# Patient Record
Sex: MALE | Race: WHITE | NOT HISPANIC OR LATINO | Employment: FULL TIME | URBAN - METROPOLITAN AREA
[De-identification: names, ages, dates, MRNs, and addresses within clinical notes are randomized per-mention and may not be internally consistent; named-entity substitution may affect disease eponyms.]

---

## 2020-10-02 ENCOUNTER — OFFICE VISIT - RIVER FALLS (OUTPATIENT)
Dept: FAMILY MEDICINE | Facility: CLINIC | Age: 50
End: 2020-10-02

## 2020-10-02 ENCOUNTER — COMMUNICATION - RIVER FALLS (OUTPATIENT)
Dept: FAMILY MEDICINE | Facility: CLINIC | Age: 50
End: 2020-10-02

## 2020-10-02 ENCOUNTER — AMBULATORY - RIVER FALLS (OUTPATIENT)
Dept: FAMILY MEDICINE | Facility: CLINIC | Age: 50
End: 2020-10-02

## 2021-11-17 DIAGNOSIS — Z11.59 ENCOUNTER FOR SCREENING FOR OTHER VIRAL DISEASES: ICD-10-CM

## 2021-12-09 RX ORDER — FLUOXETINE 40 MG/1
40 CAPSULE ORAL DAILY
COMMUNITY

## 2021-12-10 ENCOUNTER — LAB (OUTPATIENT)
Dept: LAB | Facility: CLINIC | Age: 51
End: 2021-12-10
Attending: INTERNAL MEDICINE
Payer: COMMERCIAL

## 2021-12-10 DIAGNOSIS — Z11.59 ENCOUNTER FOR SCREENING FOR OTHER VIRAL DISEASES: ICD-10-CM

## 2021-12-10 PROCEDURE — U0005 INFEC AGEN DETEC AMPLI PROBE: HCPCS

## 2021-12-10 PROCEDURE — U0003 INFECTIOUS AGENT DETECTION BY NUCLEIC ACID (DNA OR RNA); SEVERE ACUTE RESPIRATORY SYNDROME CORONAVIRUS 2 (SARS-COV-2) (CORONAVIRUS DISEASE [COVID-19]), AMPLIFIED PROBE TECHNIQUE, MAKING USE OF HIGH THROUGHPUT TECHNOLOGIES AS DESCRIBED BY CMS-2020-01-R: HCPCS

## 2021-12-11 LAB — SARS-COV-2 RNA RESP QL NAA+PROBE: NEGATIVE

## 2021-12-12 ENCOUNTER — HEALTH MAINTENANCE LETTER (OUTPATIENT)
Age: 51
End: 2021-12-12

## 2021-12-13 ENCOUNTER — ANESTHESIA (OUTPATIENT)
Dept: SURGERY | Facility: CLINIC | Age: 51
End: 2021-12-13
Payer: COMMERCIAL

## 2021-12-13 ENCOUNTER — ANESTHESIA EVENT (OUTPATIENT)
Dept: SURGERY | Facility: CLINIC | Age: 51
End: 2021-12-13
Payer: COMMERCIAL

## 2021-12-13 ENCOUNTER — HOSPITAL ENCOUNTER (OUTPATIENT)
Facility: CLINIC | Age: 51
Discharge: HOME | End: 2021-12-13
Attending: INTERNAL MEDICINE | Admitting: INTERNAL MEDICINE
Payer: COMMERCIAL

## 2021-12-13 VITALS
SYSTOLIC BLOOD PRESSURE: 137 MMHG | BODY MASS INDEX: 47.74 KG/M2 | OXYGEN SATURATION: 100 % | HEART RATE: 54 BPM | DIASTOLIC BLOOD PRESSURE: 91 MMHG | HEIGHT: 68 IN | WEIGHT: 315 LBS | RESPIRATION RATE: 18 BRPM | TEMPERATURE: 97 F

## 2021-12-13 LAB — COLONOSCOPY: NORMAL

## 2021-12-13 PROCEDURE — 272N000001 HC OR GENERAL SUPPLY STERILE: Performed by: INTERNAL MEDICINE

## 2021-12-13 PROCEDURE — 999N000141 HC STATISTIC PRE-PROCEDURE NURSING ASSESSMENT: Performed by: INTERNAL MEDICINE

## 2021-12-13 PROCEDURE — 710N000012 HC RECOVERY PHASE 2, PER MINUTE: Performed by: INTERNAL MEDICINE

## 2021-12-13 PROCEDURE — 360N000075 HC SURGERY LEVEL 2, PER MIN: Performed by: INTERNAL MEDICINE

## 2021-12-13 PROCEDURE — 370N000017 HC ANESTHESIA TECHNICAL FEE, PER MIN: Performed by: INTERNAL MEDICINE

## 2021-12-13 PROCEDURE — 88305 TISSUE EXAM BY PATHOLOGIST: CPT | Mod: TC | Performed by: INTERNAL MEDICINE

## 2021-12-13 PROCEDURE — 258N000003 HC RX IP 258 OP 636: Performed by: ANESTHESIOLOGY

## 2021-12-13 PROCEDURE — 250N000011 HC RX IP 250 OP 636: Performed by: NURSE ANESTHETIST, CERTIFIED REGISTERED

## 2021-12-13 RX ORDER — MEPERIDINE HYDROCHLORIDE 50 MG/ML
12.5 INJECTION INTRAMUSCULAR; INTRAVENOUS; SUBCUTANEOUS
Status: CANCELLED | OUTPATIENT
Start: 2021-12-13

## 2021-12-13 RX ORDER — ONDANSETRON 2 MG/ML
4 INJECTION INTRAMUSCULAR; INTRAVENOUS
Status: DISCONTINUED | OUTPATIENT
Start: 2021-12-13 | End: 2021-12-13 | Stop reason: HOSPADM

## 2021-12-13 RX ORDER — LIDOCAINE 40 MG/G
CREAM TOPICAL
Status: DISCONTINUED | OUTPATIENT
Start: 2021-12-13 | End: 2021-12-13 | Stop reason: HOSPADM

## 2021-12-13 RX ORDER — ONDANSETRON 2 MG/ML
4 INJECTION INTRAMUSCULAR; INTRAVENOUS EVERY 30 MIN PRN
Status: CANCELLED | OUTPATIENT
Start: 2021-12-13

## 2021-12-13 RX ORDER — FLUMAZENIL 0.1 MG/ML
0.2 INJECTION, SOLUTION INTRAVENOUS
Status: CANCELLED | OUTPATIENT
Start: 2021-12-13 | End: 2021-12-13

## 2021-12-13 RX ORDER — ONDANSETRON 4 MG/1
4 TABLET, ORALLY DISINTEGRATING ORAL EVERY 30 MIN PRN
Status: CANCELLED | OUTPATIENT
Start: 2021-12-13

## 2021-12-13 RX ORDER — LIDOCAINE 40 MG/G
CREAM TOPICAL
Status: CANCELLED | OUTPATIENT
Start: 2021-12-13

## 2021-12-13 RX ORDER — CALCIUM CARBONATE 500 MG/1
2 TABLET, CHEWABLE ORAL
COMMUNITY

## 2021-12-13 RX ORDER — NALOXONE HYDROCHLORIDE 0.4 MG/ML
0.2 INJECTION, SOLUTION INTRAMUSCULAR; INTRAVENOUS; SUBCUTANEOUS
Status: CANCELLED | OUTPATIENT
Start: 2021-12-13

## 2021-12-13 RX ORDER — NALOXONE HYDROCHLORIDE 0.4 MG/ML
0.4 INJECTION, SOLUTION INTRAMUSCULAR; INTRAVENOUS; SUBCUTANEOUS
Status: CANCELLED | OUTPATIENT
Start: 2021-12-13

## 2021-12-13 RX ORDER — SODIUM CHLORIDE, SODIUM LACTATE, POTASSIUM CHLORIDE, CALCIUM CHLORIDE 600; 310; 30; 20 MG/100ML; MG/100ML; MG/100ML; MG/100ML
INJECTION, SOLUTION INTRAVENOUS CONTINUOUS
Status: CANCELLED | OUTPATIENT
Start: 2021-12-13

## 2021-12-13 RX ORDER — ONDANSETRON 4 MG/1
4 TABLET, ORALLY DISINTEGRATING ORAL EVERY 6 HOURS PRN
Status: CANCELLED | OUTPATIENT
Start: 2021-12-13

## 2021-12-13 RX ORDER — ONDANSETRON 2 MG/ML
4 INJECTION INTRAMUSCULAR; INTRAVENOUS EVERY 6 HOURS PRN
Status: CANCELLED | OUTPATIENT
Start: 2021-12-13

## 2021-12-13 RX ORDER — FENTANYL CITRATE 50 UG/ML
25 INJECTION, SOLUTION INTRAMUSCULAR; INTRAVENOUS
Status: CANCELLED | OUTPATIENT
Start: 2021-12-13

## 2021-12-13 RX ORDER — SODIUM CHLORIDE, SODIUM LACTATE, POTASSIUM CHLORIDE, CALCIUM CHLORIDE 600; 310; 30; 20 MG/100ML; MG/100ML; MG/100ML; MG/100ML
INJECTION, SOLUTION INTRAVENOUS CONTINUOUS
Status: DISCONTINUED | OUTPATIENT
Start: 2021-12-13 | End: 2021-12-13 | Stop reason: HOSPADM

## 2021-12-13 RX ORDER — FENTANYL CITRATE 50 UG/ML
25 INJECTION, SOLUTION INTRAMUSCULAR; INTRAVENOUS EVERY 5 MIN PRN
Status: CANCELLED | OUTPATIENT
Start: 2021-12-13

## 2021-12-13 RX ORDER — ALBUTEROL SULFATE 90 UG/1
2 AEROSOL, METERED RESPIRATORY (INHALATION) EVERY 6 HOURS
COMMUNITY

## 2021-12-13 RX ORDER — PROCHLORPERAZINE MALEATE 10 MG
10 TABLET ORAL EVERY 6 HOURS PRN
Status: CANCELLED | OUTPATIENT
Start: 2021-12-13

## 2021-12-13 RX ORDER — ONDANSETRON 2 MG/ML
4 INJECTION INTRAMUSCULAR; INTRAVENOUS
Status: CANCELLED | OUTPATIENT
Start: 2021-12-13

## 2021-12-13 RX ORDER — PROPOFOL 10 MG/ML
INJECTION, EMULSION INTRAVENOUS CONTINUOUS PRN
Status: DISCONTINUED | OUTPATIENT
Start: 2021-12-13 | End: 2021-12-13

## 2021-12-13 RX ADMIN — SODIUM CHLORIDE, POTASSIUM CHLORIDE, SODIUM LACTATE AND CALCIUM CHLORIDE: 600; 310; 30; 20 INJECTION, SOLUTION INTRAVENOUS at 09:02

## 2021-12-13 RX ADMIN — PROPOFOL 100 MCG/KG/MIN: 10 INJECTION, EMULSION INTRAVENOUS at 09:51

## 2021-12-13 ASSESSMENT — MIFFLIN-ST. JEOR: SCORE: 2445.44

## 2021-12-13 NOTE — ANESTHESIA CARE TRANSFER NOTE
Patient: Mainor Johnson    Procedure: Procedure(s):  COLONOSCOPY with polypectomy        Diagnosis: Screen for colon cancer [Z12.11]  Diagnosis Additional Information: No value filed.    Anesthesia Type:   MAC     Note:    Oropharynx: oropharynx clear of all foreign objects  Level of Consciousness: awake  Oxygen Supplementation: face mask  Level of Supplemental Oxygen (L/min / FiO2): 4  Independent Airway: airway patency satisfactory and stable  Dentition: dentition unchanged  Vital Signs Stable: post-procedure vital signs reviewed and stable  Report to RN Given: handoff report given  Patient transferred to: Phase II    Handoff Report: Identifed the Patient, Identified the Reponsible Provider, Reviewed the pertinent medical history, Discussed the surgical course, Reviewed Intra-OP anesthesia mangement and issues during anesthesia, Set expectations for post-procedure period and Allowed opportunity for questions and acknowledgement of understanding      Vitals:  Vitals Value Taken Time   BP     Temp     Pulse     Resp     SpO2         Electronically Signed By: KY Peters CRNA  VS pending from PACU RN, Pt conversant and stable when I left his side  December 13, 2021  10:22 AM

## 2021-12-13 NOTE — PRE-PROCEDURE INSTRUCTIONS
"PRE PROCEDURE NOTE      Chief Complaint/Reason for Procedure:              H and P reviewed    Screening colon      History and Physical Reviewed:   Reviewed no changes               Pre-sedation assessment:            /81   Temp 97  F (36.1  C) (Temporal)   Resp 16   Ht 1.715 m (5' 7.5\")   Wt (!) 162.4 kg (358 lb)   SpO2 97%   BMI 55.24 kg/m    General appearance: alert, appears stated age and cooperative  Lungs: clear to auscultation bilaterally  Heart: S1, S2 normal, no murmur, click, rub or gallop, regular rate and rhythm, chest is clear without rales or wheezing, no pedal edema, no JVD, no hepatosplenomegaly  Abdomen: soft, non-tender; bowel sounds normal; no masses,  no organomegaly  Extremities: extremities normal, atraumatic, no cyanosis or edema      Previous reaction to anesthesia/sedation:  [unfilled]      Sedation Plan based on assessment: Moderate      Comments: BMI      ASA Classification: as per anesthesiology      Impression:  Patient deemed adequate candidate for conscious sedation         Plan:   colonoscopy      Susan Avitia MD, MD  12/13/2021 9:42 AM  [facility]                              "

## 2021-12-13 NOTE — ANESTHESIA POSTPROCEDURE EVALUATION
Patient: Mainor Johnson    Procedure: Procedure(s):  COLONOSCOPY with polypectomy        Diagnosis:Screen for colon cancer [Z12.11]  Diagnosis Additional Information: No value filed.    Anesthesia Type:  MAC    Note:  Disposition: Outpatient   Postop Pain Control: Uneventful            Sign Out: Well controlled pain   PONV: No   Neuro/Psych: Uneventful            Sign Out: Acceptable/Baseline neuro status   Airway/Respiratory: Uneventful            Sign Out: Acceptable/Baseline resp. status   CV/Hemodynamics: Uneventful            Sign Out: Acceptable CV status; No obvious hypovolemia; No obvious fluid overload   Other NRE: NONE   DID A NON-ROUTINE EVENT OCCUR? No           Last vitals:  Vitals Value Taken Time   /85 12/13/21 1045   Temp 36.1  C (97  F) 12/13/21 1020   Pulse 50 12/13/21 1045   Resp 16 12/13/21 1045   SpO2 100 % 12/13/21 1045       Electronically Signed By: SURENDRA PACHECO MD  December 13, 2021  11:45 AM

## 2021-12-13 NOTE — ANESTHESIA PREPROCEDURE EVALUATION
Anesthesia Pre-Procedure Evaluation    Patient: Mainor Johnson   MRN: [id number] : 1970        Preoperative Diagnosis: Screen for colon cancer [Z12.11]    Procedure : Procedure(s):  COLONOSCOPY          Past Medical History:   Diagnosis Date     Arthritis      Fibromyalgia      Obese      Other chronic pain      Sleep apnea      Uncomplicated asthma       Past Surgical History:   Procedure Laterality Date     BACK SURGERY       ORTHOPEDIC SURGERY        No Known Allergies   Social History     Tobacco Use     Smoking status: Never Smoker     Smokeless tobacco: Never Used   Substance Use Topics     Alcohol use: Not Currently      Wt Readings from Last 1 Encounters:   21 (!) 162.4 kg (358 lb)        Anesthesia Evaluation   Pt has had prior anesthetic.     No history of anesthetic complications       ROS/MED HX  ENT/Pulmonary:     (+) sleep apnea, uses CPAP, Intermittent, asthma Treatment: Inhaler prn,      Neurologic:  - neg neurologic ROS     Cardiovascular:  - neg cardiovascular ROS     METS/Exercise Tolerance:     Hematologic:       Musculoskeletal:   (+) arthritis,     GI/Hepatic:     (+) GERD,     Renal/Genitourinary:  - neg Renal ROS     Endo:     (+) Obesity,     Psychiatric/Substance Use:     (+) psychiatric history anxiety     Infectious Disease:  - neg infectious disease ROS     Malignancy:  - neg malignancy ROS     Other:  - neg other ROS    (+) , H/O Chronic Pain,        Physical Exam    Airway  airway exam normal      Mallampati: IV   TM distance: > 3 FB   Neck ROM: full   Mouth opening: > 3 cm    Respiratory Devices and Support         Dental  no notable dental history         Cardiovascular   cardiovascular exam normal       Rhythm and rate: regular and normal     Pulmonary   pulmonary exam normal        breath sounds clear to auscultation           OUTSIDE LABS:  CBC: No results found for: WBC, HGB, HCT, PLT  BMP: No results found for: NA, POTASSIUM, CHLORIDE, CO2, BUN, CR, GLC  COAGS: No  results found for: PTT, INR, FIBR  POC: No results found for: BGM, HCG, HCGS  HEPATIC: No results found for: ALBUMIN, PROTTOTAL, ALT, AST, GGT, ALKPHOS, BILITOTAL, BILIDIRECT, JOSEFINA  OTHER: No results found for: PH, LACT, A1C, CANDACE, PHOS, MAG, LIPASE, AMYLASE, TSH, T4, T3, CRP, SED    Anesthesia Plan    ASA Status:  4   NPO Status:  NPO Appropriate    Anesthesia Type: MAC.     - Reason for MAC: straight local not clinically adequate              Consents    Anesthesia Plan(s) and associated risks, benefits, and realistic alternatives discussed. Questions answered and patient/representative(s) expressed understanding.     - Discussed: Risks, Benefits and Alternatives for BOTH SEDATION and the PROCEDURE were discussed     - Discussed with:  Patient         Postoperative Care    Pain management: IV analgesics, Oral pain medications, Multi-modal analgesia.   PONV prophylaxis: Ondansetron (or other 5HT-3), Dexamethasone or Solumedrol, Droperidol or Haldol, Background Propofol Infusion     Comments:    Other Comments: Spoke with patient may need to convert to a general anesthetic if MAC is not tolerated             SURENDRA PACHECO MD

## 2021-12-13 NOTE — OR NURSING
Dr. Sweeney MDA  Updated on low HR, as low at 44.  Pt states he feels anxious but not light headed or dizzy.  Pt up to side of bed per MDA orders to watch.  Able to drink and eat with slight increase in BP but then returns to low 50's high 40's.  Brandon belt placed on patient and ambulated to bathroom, able to urinate and per MDA ok to discharge at this time

## 2021-12-13 NOTE — CONSULTS
"PRE PROCEDURE NOTE      Chief Complaint/Reason for Procedure:              Colon screen  H and P reviewed      History and Physical Reviewed:   Reviewed no changes               Pre-sedation assessment:            /81   Temp 97  F (36.1  C) (Temporal)   Resp 16   Ht 1.715 m (5' 7.5\")   Wt (!) 162.4 kg (358 lb)   SpO2 97%   BMI 55.24 kg/m    General appearance: alert, appears stated age and cooperative  Lungs: clear to auscultation bilaterally  Heart: S1, S2 normal, no murmur, click, rub or gallop, regular rate and rhythm, chest is clear without rales or wheezing, no pedal edema, no JVD, no hepatosplenomegaly  Abdomen: soft, non-tender; bowel sounds normal; no masses,  no organomegaly  Extremities: extremities normal, atraumatic, no cyanosis or edema      Previous reaction to anesthesia/sedation:  [unfilled]      Sedation Plan based on assessment: Moderate      Comments:       ASA Classification: PIV      Impression:  Patient deemed adequate candidate for conscious sedation         Plan:   colonoscopy      Susan Vianca Avitia MD, MD  12/13/2021 9:48 AM  [facility]                              "

## 2021-12-14 LAB
PATH REPORT.COMMENTS IMP SPEC: NORMAL
PATH REPORT.COMMENTS IMP SPEC: NORMAL
PATH REPORT.FINAL DX SPEC: NORMAL
PATH REPORT.GROSS SPEC: NORMAL
PATH REPORT.MICROSCOPIC SPEC OTHER STN: NORMAL
PATH REPORT.RELEVANT HX SPEC: NORMAL
PHOTO IMAGE: NORMAL

## 2021-12-14 PROCEDURE — 88305 TISSUE EXAM BY PATHOLOGIST: CPT | Mod: 26 | Performed by: PATHOLOGY

## 2022-02-16 NOTE — TELEPHONE ENCOUNTER
---------------------  From: Corrine Yang PA-C   To: Appointment Pool ([id number] [facility] [place]);     Sent: 10/2/2020 10:27:52 AM CDT  Subject: covid test      please schedule for covid testing today---------------------  From: Yelitza Doshi (Appointment Pool ([id number]_[facility]))   To: Corrine Yang PA-C;     Sent: 10/2/2020 10:32:20 AM CDT  Subject: RE: covid test      [facility]

## 2022-02-16 NOTE — PROGRESS NOTES
Chief Complaint    Patient c/o nasal drainage, scratchy throat, abdominal pain, headache, fatigue, sneezing and cough x2 days. Verbal consent granted for video visit  History of Present Illness      Today's visit was conducted via video due to the COVID-19 pandemic. PT consent to video visit was obtained and documented       Call Start Time:  10:20      Call End Time:   1026      2 day hx of uri sx including cough, congestion, HA, sweats without documented fever.  No nausea or voiting but does have stomach upset. no diarrhea. No sob or difficulty breathing.       NO known exposures to covid 19   Review of Systems      Review of systems is negative with the exception of those noted in HPI          Physical Exam      nad appears well         Assessment/Plan       1. Acute URI (J06.9)         conserviatve measures discussed.  curbside covid testing.   Push fluids, rest and ibuprofen or tylenol for comfort.  should seek care if sigificant sob, difficulty breathing                2. Close exposure to 2019 novel coronavirus (Z20.828)         Ordered:          SARS-CoV-2 RNA (COVID-19), Qualitative NAAT* (IfOnly), Specimen Type: Anterior Nares, Collection Date: 10/02/20 10:38:00 CDT           Patient Information     Name:JOHANNA LEVINE      Address:      [address] [place] [phone]     Sex:Male     YOB: 1970     [phone]     MRN:[id number]     FIN:[id number]     Location:[facility]     Date of Service:10/02/2020      Primary Care Physician:       NONE ,       Attending Physician:       Trey GRIFFIN, Corrine CLEMENTS, [phone]  Problem List/Past Medical History    Ongoing     Family history of prostate cancer     Obesity, Unspecified     Unspecified Sleep Apnea    Historical     No qualifying data  Procedure/Surgical History     Rotator cuff repair (10/30/2000)           Carpal tunnel decompression (10/30/1997)        Medications    PROZAC 20 MG ORAL CAPSULE  (c55987), 20 mg, Oral    tobramycin 0.3% ophthalmic solution, 1 drop(s), Eye-Both, qid  Allergies    No Known Medication Allergies  Social History    Smoking Status     Never smoker

## 2022-02-16 NOTE — TELEPHONE ENCOUNTER
---------------------  From: Maliha Cisse CMA   Sent: 10/2/2020 4:14:10 PM CDT  Subject: Curbside Testing     Pt here for curbside testing for covid per Corrine Yang. 02 Sat=98%. Specimen sent to [facility] with no priority. Faxed forms to [facility].

## 2022-02-16 NOTE — NURSING NOTE
Comprehensive Intake Entered On:  10/2/2020 10:13 AM CDT    Performed On:  10/2/2020 10:07 AM CDT by Yelitza Gupta CMA               Summary   Chief Complaint :   Patient c/o nasal drainage, scratchy throat, abdominal pain, headache, fatigue, sneezing and cough x2 days. Verbal consent granted for video visit   Yelitza Gupta CMA - 10/2/2020 10:07 AM CDT   Health Status   Allergies Verified? :   Yes   Medication History Verified? :   Yes   Medical History Verified? :   Yes   Pre-Visit Planning Status :   Completed   Tobacco Use? :   Never smoker   Yelitza Gupta CMA - 10/2/2020 10:07 AM CDT   Consents   Consent for Immunization Exchange :   Consent Granted   Consent for Immunizations to Providers :   Consent Granted   Yelitza Gupta CMA - 10/2/2020 10:07 AM CDT   Meds / Allergies   (As Of: 10/2/2020 10:13:15 AM CDT)   Allergies (Active)   No Known Medication Allergies  Estimated Onset Date:   Unspecified ; Created By:   Yelitza Gupta CMA; Reaction Status:   Active ; Category:   Drug ; Substance:   No Known Medication Allergies ; Type:   Allergy ; Updated By:   Yelitza Gupta CMA; Reviewed Date:   10/2/2020 10:10 AM CDT        Medication List   (As Of: 10/2/2020 10:13:15 AM CDT)   Prescription/Discharge Order    tobramycin ophthalmic  :   tobramycin ophthalmic ; Status:   Prescribed ; Ordered As Mnemonic:   tobramycin 0.3% ophthalmic solution ; Simple Display Line:   1 drop(s), Both eyes, QID, 5 mL ; Ordering Provider:   Matheus Lawson PA-C; Catalog Code:   tobramycin ophthalmic ; Order Dt/Tm:   8/24/2014 10:39:12 AM CDT            Home Meds    fluoxetine  :   fluoxetine ; Status:   Documented ; Ordered As Mnemonic:   PROZAC 20 MG ORAL CAPSULE ([id number]) ; Simple Display Line:   20 mg, po, 30 cap(s) ; Catalog Code:   FLUoxetine ; Order Dt/Tm:   3/4/2014 7:50:14 PM CST ; Comment:   Freq: Daily;            ID Risk Screen   Recent Travel History :   No recent travel   Family Member Travel History :   No recent travel    Other Exposure to Infectious Disease :   Unknown   Yelitza Gupta CMA - 10/2/2020 10:07 AM CDT

## 2022-10-03 ENCOUNTER — HEALTH MAINTENANCE LETTER (OUTPATIENT)
Age: 52
End: 2022-10-03

## 2023-02-11 ENCOUNTER — HEALTH MAINTENANCE LETTER (OUTPATIENT)
Age: 53
End: 2023-02-11

## 2024-03-09 ENCOUNTER — HEALTH MAINTENANCE LETTER (OUTPATIENT)
Age: 54
End: 2024-03-09

## (undated) DEVICE — SNARE OVAL SMALL DISP 100600

## (undated) DEVICE — TUBING SUCTION MEDI-VAC 1/4"X20' N620A - HE

## (undated) DEVICE — SUCTION MANIFOLD NEPTUNE 2 SYS 1 PORT 702-025-000

## (undated) DEVICE — SOL WATER IRRIG 1000ML BOTTLE 2F7114